# Patient Record
Sex: FEMALE | NOT HISPANIC OR LATINO | URBAN - METROPOLITAN AREA
[De-identification: names, ages, dates, MRNs, and addresses within clinical notes are randomized per-mention and may not be internally consistent; named-entity substitution may affect disease eponyms.]

---

## 2017-05-20 ENCOUNTER — IMPORTED ENCOUNTER (OUTPATIENT)
Dept: URBAN - METROPOLITAN AREA CLINIC 1 | Facility: CLINIC | Age: 62
End: 2017-05-20

## 2017-05-20 PROBLEM — H40.023: Noted: 2017-05-20

## 2017-05-20 PROBLEM — H04.123: Noted: 2017-05-20

## 2017-05-20 PROBLEM — H25.813: Noted: 2017-05-20

## 2017-05-20 PROCEDURE — 92015 DETERMINE REFRACTIVE STATE: CPT

## 2017-05-20 PROCEDURE — 92004 COMPRE OPH EXAM NEW PT 1/>: CPT

## 2017-05-20 NOTE — PATIENT DISCUSSION
1.  Cataract OU:  Visually Significant secondary to glare discussed the risks benefits alternatives and limitations of cataract surgery. The patient stated a full understanding and a desire to proceed with the procedure. The patient will need to return for preop appointment with cataract measurements and to have any additional questions answered and start pre-operative eye drops as directed. Phaco PCLb OS then ODOtherwise follow-up 6mo/10 glare2. Dry Eyes OU -The use of artificial tears QID OU Routinely and Q1hr while on computer Routinely were recommended. 3.  Glaucoma Suspect OU : (.8 OU) will have w/u after phaco. Patient is considered high risk. Condition was discussed with patient and patient understands. Will continue to monitor patient for any progression in condition. Patient was advised to call us with any problems questions or concerns. 4.  Return for an appointment for ascan/hp with Dr. Clark Weinberg.

## 2017-06-19 ENCOUNTER — IMPORTED ENCOUNTER (OUTPATIENT)
Dept: URBAN - METROPOLITAN AREA CLINIC 1 | Facility: CLINIC | Age: 62
End: 2017-06-19

## 2017-06-19 PROBLEM — H25.813: Noted: 2017-06-19

## 2017-06-19 PROCEDURE — 92136 OPHTHALMIC BIOMETRY: CPT

## 2017-06-19 NOTE — PATIENT DISCUSSION
1. Cataract OU:  Visually Significant secondary to glare discussed the risks benefits alternatives and limitations of cataract surgery. The patient stated a full understanding and a desire to proceed with the procedure. Pt understands will need glasses for best corrected vision. Phaco PCL OS then OD. Standard lens.

## 2017-06-28 ENCOUNTER — IMPORTED ENCOUNTER (OUTPATIENT)
Dept: URBAN - METROPOLITAN AREA CLINIC 1 | Facility: CLINIC | Age: 62
End: 2017-06-28

## 2017-06-29 ENCOUNTER — IMPORTED ENCOUNTER (OUTPATIENT)
Dept: URBAN - METROPOLITAN AREA CLINIC 1 | Facility: CLINIC | Age: 62
End: 2017-06-29

## 2017-06-29 PROBLEM — Z96.1: Noted: 2017-06-29

## 2017-06-29 PROCEDURE — 99024 POSTOP FOLLOW-UP VISIT: CPT

## 2017-07-03 ENCOUNTER — IMPORTED ENCOUNTER (OUTPATIENT)
Dept: URBAN - METROPOLITAN AREA CLINIC 1 | Facility: CLINIC | Age: 62
End: 2017-07-03

## 2017-07-03 PROBLEM — H25.811: Noted: 2017-07-03

## 2017-07-03 PROBLEM — Z96.1: Noted: 2017-07-03

## 2017-07-03 PROCEDURE — 92136 OPHTHALMIC BIOMETRY: CPT

## 2017-07-03 NOTE — PATIENT DISCUSSION
1.  Cataract OD: Visually Significant secondary to glare discussed the risks benefits alternatives and limitations of cataract surgery. The patient stated a full understanding and a desire to proceed with the procedure. The patient will need to return for preop appointment with cataract measurements and to have any additional questions answered and start pre-operative eye drops as directed. Pt understands they will need glasses post-op to achieve their best corrected vision. Phaco PCL OD (Standard) 2. POW#1  CE/IOL OS (Standard) doing well.   Discontinue Ocuflox Use Lotemax BID OS till out Use Prolensa Qdaily OS till out

## 2017-07-12 ENCOUNTER — IMPORTED ENCOUNTER (OUTPATIENT)
Dept: URBAN - METROPOLITAN AREA CLINIC 1 | Facility: CLINIC | Age: 62
End: 2017-07-12

## 2017-07-13 ENCOUNTER — IMPORTED ENCOUNTER (OUTPATIENT)
Dept: URBAN - METROPOLITAN AREA CLINIC 1 | Facility: CLINIC | Age: 62
End: 2017-07-13

## 2017-07-13 PROBLEM — Z96.1: Noted: 2017-07-13

## 2017-07-13 PROCEDURE — 99024 POSTOP FOLLOW-UP VISIT: CPT

## 2017-07-13 NOTE — PATIENT DISCUSSION
1. POD#1 CE/IOL OD (Standard) doing well. Continue all 3 gtts as prescribed and until gone. Use Lotemax BID OD Prolensa Qdaily OD Ocuflox TID OD 2. POW#1  CE/IOL OS (Standard) doing well.   Use Lotemax BID OS till out Use Prolensa Qdaily OS till out Tears QID OU Routinely F/u as scheduled

## 2017-08-03 ENCOUNTER — IMPORTED ENCOUNTER (OUTPATIENT)
Dept: URBAN - METROPOLITAN AREA CLINIC 1 | Facility: CLINIC | Age: 62
End: 2017-08-03

## 2017-08-03 PROBLEM — Z96.1: Noted: 2017-08-03

## 2017-08-03 PROCEDURE — 99024 POSTOP FOLLOW-UP VISIT: CPT

## 2017-08-03 NOTE — PATIENT DISCUSSION
1. POM#1 CE/IOL OU (Standard OU) doing well. Use Lotemax BID OU till out Use Prolensa Qdaily OU till out MRX for glasses givenReturn for an appointment in 3-4 months 30/OCT/VF with Dr. Ranjith Boyle.

## 2017-11-21 ENCOUNTER — IMPORTED ENCOUNTER (OUTPATIENT)
Dept: URBAN - METROPOLITAN AREA CLINIC 1 | Facility: CLINIC | Age: 62
End: 2017-11-21

## 2017-11-21 PROBLEM — H04.123: Noted: 2017-11-21

## 2017-11-21 PROBLEM — H40.023: Noted: 2017-11-21

## 2017-11-21 PROBLEM — Z96.1: Noted: 2017-11-21

## 2017-11-21 PROCEDURE — 92133 CPTRZD OPH DX IMG PST SGM ON: CPT

## 2017-11-21 PROCEDURE — 92015 DETERMINE REFRACTIVE STATE: CPT

## 2017-11-21 PROCEDURE — 92014 COMPRE OPH EXAM EST PT 1/>: CPT

## 2017-11-21 PROCEDURE — 92083 EXTENDED VISUAL FIELD XM: CPT

## 2017-11-21 NOTE — PATIENT DISCUSSION
1.  Glaucoma Suspect OU (0.8 OU): Stable IOP OU. OCT normal OU. HVF defects OU likely artifact given test #1. Treat w/ any future progression. H/o being treated for COAG in past. Taken off drops after Bariatric sx. Patient is considered high risk. Condition was discussed with patient and patient understands. Will continue to monitor patient for any progression in condition. Patient was advised to call us with any problems questions or concerns. 2.  Dry Eyes OU -The use/continuation of artificial tears were recommended. 3.  Pseudophakia OU - 4. Return for an appointment for a 10/HVF in 6 months with Dr. Ricky Rodriguez.

## 2018-05-03 ENCOUNTER — IMPORTED ENCOUNTER (OUTPATIENT)
Dept: URBAN - METROPOLITAN AREA CLINIC 1 | Facility: CLINIC | Age: 63
End: 2018-05-03

## 2018-05-03 PROBLEM — Z96.1: Noted: 2018-05-03

## 2018-05-03 PROBLEM — H04.123: Noted: 2018-05-03

## 2018-05-03 PROBLEM — H16.143: Noted: 2018-05-03

## 2018-05-03 PROBLEM — H40.023: Noted: 2018-05-03

## 2018-05-03 PROCEDURE — 92083 EXTENDED VISUAL FIELD XM: CPT

## 2018-05-03 PROCEDURE — 92012 INTRM OPH EXAM EST PATIENT: CPT

## 2018-05-03 NOTE — PATIENT DISCUSSION
1.  Glaucoma Suspect OU (0.8 OU/ (-) FHX): Stable IOP OU. My impression remains. HVF defects likely artifact OU. If OCT or HVF show any progression consider treating. H/o being treated for COAG in past. Taken off drops after Bariatric sx. Patient is considered high risk. Condition was discussed with patient and patient understands. Will continue to monitor patient for any progression in condition. Patient was advised to call us with any problems questions or concerns. 2.  Pseudophakia OU - Doing well. 3.  FRANCES w/ PEK OU-The use of artificial tears OU to TID were recommended. 4.  Return for an appointment for a 30/OCT in 6 months with Dr. Summer Harris.

## 2019-05-24 ENCOUNTER — IMPORTED ENCOUNTER (OUTPATIENT)
Dept: URBAN - METROPOLITAN AREA CLINIC 1 | Facility: CLINIC | Age: 64
End: 2019-05-24

## 2019-05-24 PROBLEM — R73.09: Noted: 2019-05-24

## 2019-05-24 PROBLEM — H40.023: Noted: 2019-05-24

## 2019-05-24 PROBLEM — H47.013: Noted: 2019-05-24

## 2019-05-24 PROBLEM — H26.493: Noted: 2019-05-24

## 2019-05-24 PROBLEM — H35.033: Noted: 2019-05-24

## 2019-05-24 PROCEDURE — 92014 COMPRE OPH EXAM EST PT 1/>: CPT

## 2019-05-24 PROCEDURE — 92133 CPTRZD OPH DX IMG PST SGM ON: CPT

## 2019-05-24 NOTE — PATIENT DISCUSSION
DM Type II (Diet Controlled) without sign of diabetic retinopathy and no blot heme on dilated retinal examination today OU No Macular Edema:  Discussed the pathophysiology of diabetes and its effect on the eye and risk of blindness. Stressed the importance of strong glucose control. Advised of importance of at least yearly dilated examinations but to contact us immediately for any problems or concerns.

## 2019-05-24 NOTE — PATIENT DISCUSSION
1.  DM Type II (Diet Controlled) without sign of diabetic retinopathy and no blot heme on dilated retinal examination today OU No Macular Edema:  Discussed the pathophysiology of diabetes and its effect on the eye and risk of blindness. Stressed the importance of strong glucose control. Advised of importance of at least yearly dilated examinations but to contact us immediately for any problems or concerns. 2. Glaucoma Suspect OU (CD 0.8 OU) IOP stable on no no gtts. OCT shows slight progression OU but will continue to observe. Consider treatment with any future progression. Pt considered High Risk. 3.  PCO OU: (Posterior Capsule Opacification)   Observe  4. GR I Hypertensive Retinopathy OU- HTN Control5. 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 Is more likely related to previously uncontrolled HTN DM and obesity. 6.  Dry Eyes OU - Use ATs TID OU Routinely. 7.  Pseudophakia OU (Standard OU) Letter to PCP MRx deferred Return for an appointment in 6 mo 10 dfe glare MRx VF 24-2 OU with Dr. Dillon Rose.

## 2019-05-24 NOTE — PATIENT DISCUSSION
PCO OU: (Posterior Capsule Opacification)   Observe and consider yag cap when pt feels pco visually significant and visual acuity decreases to appropriate level. 17-Oct-2018

## 2019-06-14 ENCOUNTER — IMPORTED ENCOUNTER (OUTPATIENT)
Dept: URBAN - METROPOLITAN AREA CLINIC 1 | Facility: CLINIC | Age: 64
End: 2019-06-14

## 2019-06-14 PROBLEM — H26.491: Noted: 2019-06-14

## 2019-06-14 PROBLEM — H26.493: Noted: 2019-06-14

## 2019-06-14 PROCEDURE — 92015 DETERMINE REFRACTIVE STATE: CPT

## 2019-06-14 PROCEDURE — 66821 AFTER CATARACT LASER SURGERY: CPT

## 2019-06-14 PROCEDURE — 92012 INTRM OPH EXAM EST PATIENT: CPT

## 2019-06-14 NOTE — PATIENT DISCUSSION
YAG CAP OD: (Consent signed and scanned into attachments) 1 gtt Prolensa applied. The purpose and nature of the procedure possible alternative methods of treatment the risks involved and the possibility of complications were discussed with patient. The Patient wishes to proceed and the consent was signed. The laser was then performed under topical anesthesia with no complications. Post op instructions were given to patient as well as a follow-up appointment. Patient was advised to call our office if any questions or concerns. Return for an appointment for Return as scheduled YAG Cap OS with Dr. Neil Stark.

## 2019-06-14 NOTE — PATIENT DISCUSSION
1.  PCO OU- Visually Significant secondary to glare; proceed with YAG Cap OD then OS. Pros cons risks and benefits. 2.  DM Type II (Diet Controlled) without sign of diabetic retinopathy and no blot heme on dilated retinal examination today OU No Macular Edema:  Discussed the pathophysiology of diabetes and its effect on the eye and risk of blindness. Stressed the importance of strong glucose control. Advised of importance of at least yearly dilated examinations but to contact us immediately for any problems or concerns. 3. Glaucoma Suspect OU (CD 0.8 OU) IOP stable on no no gtts. Pt considered High Risk. 4.  GR I Hypertensive Retinopathy OU- HTN Control5. 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 Is more likely related to previously uncontrolled HTN DM and obesity. 6.  Dry Eyes OU - Use ATs TID OU Routinely.  7.  Pseudophakia OU (Standard OU)

## 2019-07-05 ENCOUNTER — IMPORTED ENCOUNTER (OUTPATIENT)
Dept: URBAN - METROPOLITAN AREA CLINIC 1 | Facility: CLINIC | Age: 64
End: 2019-07-05

## 2019-07-05 PROBLEM — H26.492: Noted: 2019-07-05

## 2019-07-05 PROCEDURE — 66821 AFTER CATARACT LASER SURGERY: CPT

## 2019-07-05 NOTE — PATIENT DISCUSSION
YAG CAP OS: (Consent signed and scanned into attachments) 1 gtt Prolensa applied. The purpose and nature of the procedure possible alternative methods of treatment the risks involved and the possibility of complications were discussed with patient. The Patient wishes to proceed and the consent was signed. The laser was then performed under topical anesthesia with no complications. Post op instructions were given to patient as well as a follow-up appointment. Patient was advised to call our office if any questions or concerns. Return for an appointment in 1-8 week po/yag with Dr. Summer Harris.

## 2019-11-25 ENCOUNTER — IMPORTED ENCOUNTER (OUTPATIENT)
Dept: URBAN - METROPOLITAN AREA CLINIC 1 | Facility: CLINIC | Age: 64
End: 2019-11-25

## 2019-11-25 PROBLEM — H04.123: Noted: 2019-11-25

## 2019-11-25 PROBLEM — H47.013: Noted: 2019-11-25

## 2019-11-25 PROBLEM — H35.033: Noted: 2019-11-25

## 2019-11-25 PROBLEM — H02.834: Noted: 2019-11-25

## 2019-11-25 PROBLEM — H02.831: Noted: 2019-11-25

## 2019-11-25 PROBLEM — Z96.1: Noted: 2019-11-25

## 2019-11-25 PROBLEM — H40.023: Noted: 2019-11-25

## 2019-11-25 PROCEDURE — 92083 EXTENDED VISUAL FIELD XM: CPT

## 2019-11-25 PROCEDURE — 92012 INTRM OPH EXAM EST PATIENT: CPT

## 2019-11-25 NOTE — PATIENT DISCUSSION
1.  Glaucoma Suspect OU (CD 0.8 OU) IOP stable. VF defects stable OU. Pt considered High Risk. Will treat with any future progression. 2. GR I Hypertensive Retinopathy OU -- Continue HTN Control3. 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 Is more likely related to previously uncontrolled HTN DM and obesity. 4.  Dry Eyes OU -- Use ATs TID OU Routinely. 5.  Dermatochalasis -- Observe without intervention at this time. Patient to have ptosis VF at next visit to further assess. 6.  Pseudophakia OU (Standard OU) H/o YAG Cap OU.7. H/o DM Type II (Diet Controlled) without sign of diabetic retinopathy and no blot heme No Macular EdemaReturn for an appointment in 6 months for a 30/OCT/Ptosis VF with Dr. Susie Campoverde.

## 2020-06-08 ENCOUNTER — IMPORTED ENCOUNTER (OUTPATIENT)
Dept: URBAN - METROPOLITAN AREA CLINIC 1 | Facility: CLINIC | Age: 65
End: 2020-06-08

## 2020-06-08 PROBLEM — H02.831: Noted: 2020-06-08

## 2020-06-08 PROBLEM — R73.09: Noted: 2020-06-08

## 2020-06-08 PROBLEM — H02.834: Noted: 2020-06-08

## 2020-06-08 PROBLEM — H16.143: Noted: 2020-06-08

## 2020-06-08 PROBLEM — H40.023: Noted: 2020-06-08

## 2020-06-08 PROBLEM — H04.123: Noted: 2020-06-08

## 2020-06-08 PROBLEM — Z96.1: Noted: 2020-06-08

## 2020-06-08 PROCEDURE — 92133 CPTRZD OPH DX IMG PST SGM ON: CPT

## 2020-06-08 PROCEDURE — 92014 COMPRE OPH EXAM EST PT 1/>: CPT

## 2020-06-08 PROCEDURE — 92015 DETERMINE REFRACTIVE STATE: CPT

## 2020-06-08 NOTE — PATIENT DISCUSSION
Dermatochalasis OU UL's - Visually significant with reversible VF defect and patient desires superior bleph. Risks and benefits have been discussed with the patient.

## 2020-06-08 NOTE — PATIENT DISCUSSION
1.  DM Type II (Diet Controlled) without sign of diabetic retinopathy and no blot heme on dilated retinal examination today OU No Macular Edema: H/o Gastric Bypass. Discussed the pathophysiology of diabetes and its effect on the eye and risk of blindness. Stressed the importance of strong glucose control. Advised of importance of at least yearly dilated examinations but to contact us immediately for any problems or concerns. 2. Glaucoma Suspect OU (CD 0.80 OU): No progression by OCT. IOP stable. Patient is considered high risk. Condition was discussed with patient and patient understands. Will continue to monitor patient for any progression in condition. Patient was advised to call us with any problems questions or concerns. 3.  Dermatochalasis OU UL's - Visually significant with reversible VF defect and patient desires superior bleph. Risks and benefits have been discussed with the patient. HVF shows 20° reversible defect OD 13° reversible defect OS. Patient will call to schedule Blepharoplasty UL OU **To be done in office. 4.  FRANECS w/ PEK OU- Recommend ATs TID OU Routinely 5. Pseudophakia OU - (Standard OU) H/o YAG Cap OU 6. 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 Is more likely related to previously uncontrolled HTN DM MRX for glasses given. Return for an appointment in 1 year 30/OCT with Dr. Laruy Almaraz.

## 2021-06-07 ENCOUNTER — IMPORTED ENCOUNTER (OUTPATIENT)
Dept: URBAN - METROPOLITAN AREA CLINIC 1 | Facility: CLINIC | Age: 66
End: 2021-06-07

## 2021-06-07 PROBLEM — H16.143: Noted: 2021-06-07

## 2021-06-07 PROBLEM — H04.123: Noted: 2021-06-07

## 2021-06-07 PROBLEM — H40.023: Noted: 2021-06-07

## 2021-06-07 PROBLEM — R73.09: Noted: 2021-06-07

## 2021-06-07 PROBLEM — H02.831: Noted: 2021-06-07

## 2021-06-07 PROBLEM — H02.834: Noted: 2021-06-07

## 2021-06-07 PROCEDURE — 99214 OFFICE O/P EST MOD 30 MIN: CPT

## 2021-06-07 PROCEDURE — 92133 CPTRZD OPH DX IMG PST SGM ON: CPT

## 2021-06-07 NOTE — PATIENT DISCUSSION
1.  DM Type II (Diet Controlled) without sign of diabetic retinopathy and no blot heme on dilated retinal examination today OU No Macular Edema: H/o Gastric Bypass. Discussed the pathophysiology of diabetes and its effect on the eye and risk of blindness. Stressed the importance of strong glucose control. Advised of importance of at least yearly dilated examinations but to contact us immediately for any problems or concerns. 2. Glaucoma Suspect OU (CD 0.80 OU): No progression by OCT OU. IOP stable. Patient is considered high risk. Condition was discussed with patient and patient understands. Will continue to monitor patient for any progression in condition. Patient was advised to call us with any problems questions or concerns. 3.  FRANCES w/ PEK OU- Recommend ATs TID OU Routinely 4. Dermatochalasis OU UL's - Will have pt return for VF evaluation. 5. Pseudophakia OU - (Standard OU) H/o YAG Cap OU 6. 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 Is more likely related to previously uncontrolled HTN DM Patient deferred Manifest Rx today. Return for an appointment in 3 months 10/VF (Ptosis) with Dr. Katerin Morocho.

## 2021-09-20 ENCOUNTER — IMPORTED ENCOUNTER (OUTPATIENT)
Dept: URBAN - METROPOLITAN AREA CLINIC 1 | Facility: CLINIC | Age: 66
End: 2021-09-20

## 2021-09-20 PROBLEM — H02.423: Noted: 2021-09-20

## 2021-09-20 PROCEDURE — 99213 OFFICE O/P EST LOW 20 MIN: CPT

## 2021-09-20 NOTE — PATIENT DISCUSSION
Glaucoma Suspect OU (CD 0.80 OU): IOP stable. Patient is considered high risk. 3. FRANCES w/ PEK OU- Recommend ATs TID OU Routinely 4. Pseudophakia OU - (Standard OU) H/o YAG Cap OU 5. H/o DM w/o DR OU 6. 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 Is more likely related to previously uncontrolled HTN DMReturn for an appointment in June 30 with Dr. Damaris Goetz.

## 2021-09-20 NOTE — PATIENT DISCUSSION
1.  Myogenic Ptosis OU -- Will continue to observe at this time. 2.  Glaucoma Suspect OU (CD 0.80 OU): IOP stable. Patient is considered high risk. 3. FRANCES w/ PEK OU- Recommend ATs TID OU Routinely 4. Pseudophakia OU - (Standard OU) H/o YAG Cap OU 5. H/o DM w/o DR OU 6. 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 Is more likely related to previously uncontrolled HTN DMReturn for an appointment in June 30 with Dr. Carole Treviño.

## 2022-04-02 ASSESSMENT — TONOMETRY
OS_IOP_MMHG: 17
OS_IOP_MMHG: 18
OD_IOP_MMHG: 16
OD_IOP_MMHG: 17
OS_IOP_MMHG: 16
OD_IOP_MMHG: 17
OD_IOP_MMHG: 16
OD_IOP_MMHG: 16
OS_IOP_MMHG: 16
OS_IOP_MMHG: 17
OS_IOP_MMHG: 17
OD_IOP_MMHG: 17
OD_IOP_MMHG: 16
OD_IOP_MMHG: 15
OS_IOP_MMHG: 16
OS_IOP_MMHG: 16
OD_IOP_MMHG: 17
OS_IOP_MMHG: 17
OS_IOP_MMHG: 15
OD_IOP_MMHG: 17
OD_IOP_MMHG: 17
OD_IOP_MMHG: 16
OD_IOP_MMHG: 15
OS_IOP_MMHG: 18
OS_IOP_MMHG: 15
OS_IOP_MMHG: 15
OS_IOP_MMHG: 16

## 2022-04-02 ASSESSMENT — VISUAL ACUITY
OD_CC: 20/60
OS_CC: 20/30+1
OD_CC: 20/60
OS_CC: 20/50-1
OD_CC: 20/100-1
OS_SC: 20/40
OS_PH: SC 20/30
OD_GLARE: 20/60
OS_GLARE: 20/60
OD_SC: 20/40
OD_CC: 20/40-2
OS_SC: 20/40
OD_CC: 20/60
OS_SC: 20/30
OS_CC: 20/40
OS_GLARE: 20/80
OS_CC: 20/40-1
OD_CC: 20/40
OD_SC: 20/30
OS_CC: 20/50
OD_CC: 20/60-1
OD_PH: SC 20/50 -1
OS_CC: 20/60
OS_CC: 20/60
OD_SC: 20/40
OS_CC: 20/50-1
OD_CC: 20/60
OD_SC: 20/40
OD_CC: 20/50
OS_CC: 20/50
OD_CC: 20/60
OD_SC: 20/40
OS_CC: 20/40
OS_SC: 20/40
OS_GLARE: 20/400
OD_CC: 20/40-2
OD_GLARE: 20/400
OS_CC: 20/40
OS_CC: 20/60+1
OS_GLARE: 20/400
OS_CC: 20/40
OD_GLARE: 20/400
OD_GLARE: 20/100

## 2022-05-11 PROBLEM — Z98.890: Noted: 2022-05-11

## 2022-06-20 ENCOUNTER — COMPREHENSIVE EXAM (OUTPATIENT)
Dept: URBAN - METROPOLITAN AREA CLINIC 1 | Facility: CLINIC | Age: 67
End: 2022-06-20

## 2022-06-20 DIAGNOSIS — E11.9: ICD-10-CM

## 2022-06-20 DIAGNOSIS — H47.293: ICD-10-CM

## 2022-06-20 DIAGNOSIS — H40.023: ICD-10-CM

## 2022-06-20 DIAGNOSIS — H04.123: ICD-10-CM

## 2022-06-20 DIAGNOSIS — H35.033: ICD-10-CM

## 2022-06-20 DIAGNOSIS — H16.143: ICD-10-CM

## 2022-06-20 PROCEDURE — 92014 COMPRE OPH EXAM EST PT 1/>: CPT

## 2022-06-20 PROCEDURE — 92015 DETERMINE REFRACTIVE STATE: CPT

## 2022-06-20 ASSESSMENT — VISUAL ACUITY
OS_SC: 20/40
OD_SC: 20/50

## 2022-06-20 ASSESSMENT — TONOMETRY
OD_IOP_MMHG: 15
OS_IOP_MMHG: 15

## 2023-09-29 ENCOUNTER — COMPREHENSIVE EXAM (OUTPATIENT)
Dept: URBAN - METROPOLITAN AREA CLINIC 1 | Facility: CLINIC | Age: 68
End: 2023-09-29

## 2023-09-29 DIAGNOSIS — H35.033: ICD-10-CM

## 2023-09-29 DIAGNOSIS — E11.9: ICD-10-CM

## 2023-09-29 DIAGNOSIS — H47.293: ICD-10-CM

## 2023-09-29 DIAGNOSIS — H40.023: ICD-10-CM

## 2023-09-29 DIAGNOSIS — Z96.1: ICD-10-CM

## 2023-09-29 DIAGNOSIS — H16.143: ICD-10-CM

## 2023-09-29 DIAGNOSIS — H04.123: ICD-10-CM

## 2023-09-29 PROCEDURE — 92015 DETERMINE REFRACTIVE STATE: CPT

## 2023-09-29 PROCEDURE — 99214 OFFICE O/P EST MOD 30 MIN: CPT

## 2023-09-29 ASSESSMENT — VISUAL ACUITY
OS_SC: 20/50
OD_SC: 20/60
OS_CC: J3
OD_PH: 20/40
OS_PH: 20/25-2
OD_CC: J4

## 2023-09-29 ASSESSMENT — TONOMETRY
OS_IOP_MMHG: 15
OD_IOP_MMHG: 14

## 2025-02-03 ENCOUNTER — COMPREHENSIVE EXAM (OUTPATIENT)
Age: 70
End: 2025-02-03

## 2025-02-03 DIAGNOSIS — H04.123: ICD-10-CM

## 2025-02-03 DIAGNOSIS — Z96.1: ICD-10-CM

## 2025-02-03 DIAGNOSIS — H47.293: ICD-10-CM

## 2025-02-03 DIAGNOSIS — H16.143: ICD-10-CM

## 2025-02-03 DIAGNOSIS — H40.1131: ICD-10-CM

## 2025-02-03 DIAGNOSIS — Z98.890: ICD-10-CM

## 2025-02-03 DIAGNOSIS — H35.033: ICD-10-CM

## 2025-02-03 DIAGNOSIS — E11.9: ICD-10-CM

## 2025-02-03 PROCEDURE — 99214 OFFICE O/P EST MOD 30 MIN: CPT

## 2025-02-03 PROCEDURE — 92133 CPTRZD OPH DX IMG PST SGM ON: CPT

## 2025-02-03 PROCEDURE — 92015 DETERMINE REFRACTIVE STATE: CPT

## 2025-06-06 ENCOUNTER — FOLLOW UP (OUTPATIENT)
Age: 70
End: 2025-06-06

## 2025-06-06 DIAGNOSIS — H40.1131: ICD-10-CM

## 2025-06-06 PROCEDURE — 92083 EXTENDED VISUAL FIELD XM: CPT

## 2025-06-06 PROCEDURE — 99213 OFFICE O/P EST LOW 20 MIN: CPT
